# Patient Record
Sex: FEMALE | Race: BLACK OR AFRICAN AMERICAN | NOT HISPANIC OR LATINO | Employment: UNEMPLOYED | URBAN - NONMETROPOLITAN AREA
[De-identification: names, ages, dates, MRNs, and addresses within clinical notes are randomized per-mention and may not be internally consistent; named-entity substitution may affect disease eponyms.]

---
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
## 2024-09-04 ENCOUNTER — TELEPHONE (OUTPATIENT)
Dept: PULMONOLOGY | Facility: CLINIC | Age: 19
End: 2024-09-04
Payer: MEDICAID

## 2024-09-04 NOTE — TELEPHONE ENCOUNTER
RF received from [facility]. Called [phone] not available. Also, called mother no answer left a vm to return call to scheduled appt with .

## 2024-09-16 RX ORDER — MYCOPHENOLATE MOFETIL 500 MG/1
500 TABLET ORAL 2 TIMES DAILY
COMMUNITY
Start: 2024-08-29

## 2024-09-16 RX ORDER — PREDNISONE 5 MG/1
TABLET ORAL
COMMUNITY
Start: 2024-08-26

## 2024-09-16 RX ORDER — HYDROXYCHLOROQUINE SULFATE 200 MG/1
300 TABLET, FILM COATED ORAL DAILY
COMMUNITY
Start: 2024-08-26

## 2024-09-16 RX ORDER — PREDNISONE 20 MG/1
20 TABLET ORAL
COMMUNITY
Start: 2024-06-12

## 2024-09-17 ENCOUNTER — HOSPITAL ENCOUNTER (OUTPATIENT)
Dept: RADIOLOGY | Facility: HOSPITAL | Age: 19
Discharge: HOME | End: 2024-09-17
Attending: STUDENT IN AN ORGANIZED HEALTH CARE EDUCATION/TRAINING PROGRAM

## 2024-09-17 ENCOUNTER — OFFICE VISIT (OUTPATIENT)
Dept: PULMONOLOGY | Facility: CLINIC | Age: 19
End: 2024-09-17

## 2024-09-17 VITALS
SYSTOLIC BLOOD PRESSURE: 100 MMHG | RESPIRATION RATE: 18 BRPM | DIASTOLIC BLOOD PRESSURE: 72 MMHG | OXYGEN SATURATION: 99 % | HEIGHT: 67 IN | HEART RATE: 78 BPM | BODY MASS INDEX: 22.03 KG/M2 | WEIGHT: 140.38 LBS

## 2024-09-17 DIAGNOSIS — M08.80 JUVENILE IDIOPATHIC ARTHRITIS: Primary | ICD-10-CM

## 2024-09-17 DIAGNOSIS — M32.8 OTHER FORMS OF SYSTEMIC LUPUS ERYTHEMATOSUS, UNSPECIFIED ORGAN INVOLVEMENT STATUS: ICD-10-CM

## 2024-09-17 DIAGNOSIS — M08.80 JUVENILE IDIOPATHIC ARTHRITIS: ICD-10-CM

## 2024-09-17 PROCEDURE — 99203 OFFICE O/P NEW LOW 30 MIN: CPT | Mod: S$PBB,,, | Performed by: STUDENT IN AN ORGANIZED HEALTH CARE EDUCATION/TRAINING PROGRAM

## 2024-09-17 PROCEDURE — 71046 X-RAY EXAM CHEST 2 VIEWS: CPT | Mod: TC

## 2024-09-17 PROCEDURE — 99214 OFFICE O/P EST MOD 30 MIN: CPT | Mod: PBBFAC,25 | Performed by: STUDENT IN AN ORGANIZED HEALTH CARE EDUCATION/TRAINING PROGRAM

## 2024-09-17 PROCEDURE — 71046 X-RAY EXAM CHEST 2 VIEWS: CPT | Mod: 26,,, | Performed by: RADIOLOGY

## 2024-09-17 PROCEDURE — 99999 PR PBB SHADOW E&M-EST. PATIENT-LVL IV: CPT | Mod: PBBFAC,,, | Performed by: STUDENT IN AN ORGANIZED HEALTH CARE EDUCATION/TRAINING PROGRAM

## 2024-09-17 RX ORDER — FOLIC ACID 1 MG/1
TABLET ORAL DAILY
COMMUNITY

## 2024-09-17 RX ORDER — NAPROXEN 500 MG/1
500 TABLET ORAL 2 TIMES DAILY WITH MEALS
COMMUNITY
Start: 2023-09-28

## 2024-09-17 RX ORDER — VENLAFAXINE 50 MG/1
50 TABLET ORAL DAILY
COMMUNITY

## 2024-09-17 NOTE — PROGRESS NOTES
Patient Name: Tapan Navarro   Primary Care Provider: Valorie Walker FNP   Date of Service: 09/17/2024   Reason for Referral: ?  Pleural effusion    Chief Complaint: Pleural Effusion     Subjective:      Tapan Navarro is 19 y.o. female with With past medical history significant for SLE who now presents in the setting of open containers fluid on her lungs.      Initial clinic visit 9/17/24   She follows with Rheumatology in [place] and last had a consultation with them in August of 2024.  She was reportedly referred from there due to fluid around her lungs.  She has been intermittently on prednisone, Plaquenil, CellCept, methotrexate and has had issues with compliance due to insurance challenges.  She went to an emergency department in [place] in early 8/20/24 with a she was reported to have had fluid around her lungs and she did not have any interventions drained and she was recommended outpatient follow up.  Per her most recent documentation from Rheumatology on 8/26/24 she is reportedly on prednisone, CellCept and Plaquenil.  She has been referred to us today for evaluation of this fluid around her lungs.  We do not have any imaging or reports of the imaging available at this time from the outside hospital in [place].      She presents today with no significant respiratory complaints such as dyspnea and with occasional pleuritic chest pain, not present today.        Assessment and Plan      Abnormal imaging findings on chest x-ray  ?  Pleural effusion  Systemic lupus erythematosus      Assessment:  At this time, her exam is mostly unremarkable.  I can not appreciate any decreased breath sounds on either lung.  She is hemodynamically stable, not tachycardic and normotensive.  The first step will be to obtain additional chest imaging.    Plan  Ordered a chest x-ray to be performed today   Counseled to call the clinic or go to the emergency department/call 911 in the event of worsening symptoms or any other  red flag signs/symptoms as explained to the patient in detail  Follow up in clinic and plan to be determined after imaging findings.  If significant pleural effusion, we will reach out with the patient and inform her to return for intervention/close follow-up             Follow-up   Follow-up  after imaging as above    Khoi Ovalle MD  Interventional Pulmonary and Critical Care  [facility]      Problem List Items Addressed This Visit    None          No past medical history on file.   No past surgical history on file.  No family history on file.  Review of patient's allergies indicates:  Not on File       Review of Systems       Objective:      Physical Exam  Constitutional:       General: She is not in acute distress.     Appearance: Normal appearance. She is normal weight. She is not ill-appearing or diaphoretic.   HENT:      Head: Normocephalic and atraumatic.      Nose: No congestion or rhinorrhea.      Mouth/Throat:      Mouth: Mucous membranes are moist.   Cardiovascular:      Rate and Rhythm: Normal rate and regular rhythm.      Pulses: Normal pulses.      Heart sounds: Normal heart sounds.   Pulmonary:      Effort: Pulmonary effort is normal. No respiratory distress.      Breath sounds: Normal breath sounds. No stridor. No wheezing, rhonchi or rales.   Chest:      Chest wall: No tenderness.   Abdominal:      General: Abdomen is flat.   Musculoskeletal:      Cervical back: Normal range of motion. No rigidity.      Right lower leg: No edema.      Left lower leg: No edema.   Skin:     General: Skin is warm.      Findings: No erythema.   Neurological:      General: No focal deficit present.      Mental Status: She is alert and oriented to person, place, and time. Mental status is at baseline.   Psychiatric:         Mood and Affect: Mood normal.         Behavior: Behavior normal.         Thought Content: Thought content normal.                9/17/2024    11:08 AM   Pulmonary Function Tests   SpO2  "99 %   Height 5' 7" (1.702 m)   Weight 63.7 kg (140 lb 6.4 oz)   BMI (Calculated) 22         Outpatient Encounter Medications as of 9/17/2024   Medication Sig Dispense Refill    hydroxychloroquine (PLAQUENIL) 200 mg tablet Take 300 mg by mouth once daily.      mycophenolate (CELLCEPT) 500 mg Tab Take 500 mg by mouth 2 (two) times daily.      naproxen (NAPROSYN) 500 MG tablet Take 500 mg by mouth 2 (two) times daily with meals.      predniSONE (DELTASONE) 20 MG tablet Take 20 mg by mouth.      predniSONE (DELTASONE) 5 MG tablet TAKE 4 TABLETS BY MOUTH DAILY FOR 5 DAYS, THEN 3 DAILY FOR 5 DAYS, THEN 2 DAILY FOR 5 DAYS, THEN 1 DAILY UNTIL NEXT APPOINTMENT.      folic acid (FOLVITE) 1 MG tablet Take by mouth once daily.      venlafaxine (EFFEXOR) 50 MG Tab Take 50 mg by mouth once daily.       No facility-administered encounter medications on file as of 9/17/2024.       Assessment & Plan    As above                                          No orders of the defined types were placed in this encounter.                  "

## 2024-09-18 ENCOUNTER — TELEPHONE (OUTPATIENT)
Dept: PULMONOLOGY | Facility: CLINIC | Age: 19
End: 2024-09-18

## 2024-09-18 NOTE — TELEPHONE ENCOUNTER
----- Message from Khoi Ovalle MD sent at 9/18/2024  2:55 PM CDT -----  Please let her know that her chest x-ray was within normal limits

## 2024-11-19 ENCOUNTER — PATIENT MESSAGE (OUTPATIENT)
Dept: GASTROENTEROLOGY | Facility: CLINIC | Age: 19
End: 2024-11-19

## 2024-11-24 PROBLEM — M32.8 OTHER FORMS OF SYSTEMIC LUPUS ERYTHEMATOSUS: Status: ACTIVE | Noted: 2021-06-25
